# Patient Record
Sex: FEMALE | Race: WHITE | NOT HISPANIC OR LATINO
[De-identification: names, ages, dates, MRNs, and addresses within clinical notes are randomized per-mention and may not be internally consistent; named-entity substitution may affect disease eponyms.]

---

## 2022-09-08 ENCOUNTER — APPOINTMENT (OUTPATIENT)
Dept: PAIN MANAGEMENT | Facility: CLINIC | Age: 67
End: 2022-09-08

## 2022-09-08 VITALS
DIASTOLIC BLOOD PRESSURE: 79 MMHG | HEART RATE: 80 BPM | SYSTOLIC BLOOD PRESSURE: 119 MMHG | HEIGHT: 62.5 IN | WEIGHT: 174 LBS | BODY MASS INDEX: 31.22 KG/M2

## 2022-09-08 DIAGNOSIS — G44.81 HYPNIC HEADACHE: ICD-10-CM

## 2022-09-08 PROBLEM — Z00.00 ENCOUNTER FOR PREVENTIVE HEALTH EXAMINATION: Status: ACTIVE | Noted: 2022-09-08

## 2022-09-08 PROCEDURE — 99204 OFFICE O/P NEW MOD 45 MIN: CPT

## 2022-09-13 NOTE — ASSESSMENT
[FreeTextEntry1] : trial of nortriptyline\par referral to movement for blepharospasm vs tic disorder

## 2022-09-13 NOTE — REVIEW OF SYSTEMS
[Fever] : no fever [Chills] : no chills [Feeling Poorly] : feeling poorly [As Noted in HPI] : as noted in HPI [Eye Pain] : eye pain [Nasal Discharge] : no nasal discharge [Chest Pain] : no chest pain [Shortness Of Breath] : no shortness of breath [Cough] : no cough [Constipation] : no constipation [Arthralgias] : arthralgias [Neck Pain] : neck pain [Lower Back Pain] : no lower back pain [Itching] : no itching [Convulsions] : no convulsions [Fainting] : no fainting [Sleep Disturbances] : sleep disturbances [Muscle Weakness] : no muscle weakness [FreeTextEntry3] : bilateral blepharospasm

## 2022-09-13 NOTE — HISTORY OF PRESENT ILLNESS
[FreeTextEntry1] : Pt is 67 yo woman who notes 40+ years of headaches.  Feels that they "used to be more migraine like" but now wakes up with pressure.\par She does have noted bilateral blepharospasm.\deven Can wake with headache or with some feeling of "stress in her head."\deven Does note her sleep is poor and she uses melatonin or sleep eeze at night.  Some nighttime awakening for urination and does use CPAP for sleep apnea.\par Also diagnosed 2-3 years ago by rheumatology for fibromyalgia.\deven Has had several MRI's of her head and sinuses done over time.\deven C maxifacial sinus non contrast in 2019 showed some thickening but patent.\par Lumbar MRI 5/2018 with stable ddd at several levels.\par MRI brain pre and post 4/2017 wnl.\deven Drinks decaf coffee.\deven Has been to chiropractor and has had some benefit with neck and shoulder.\par Did see Dr. mathis who had done some injections into neck and shoulders which has given her moderate benefit.\deven Has been on Topamax in the past but not "in many years."  Was on prozac but now on lexapro and wellbutrin and has been on just earlier this year.  \deven Has been on ramipril.\par + bilateral blepharospasm. [Chronic Headache] : chronic headache [Dizziness] : no dizziness [Nausea] : nausea [Neck Pain] : neck pain [Weakness] : no weakness [Daily] : daily [< 4 hours] : < 4 hours [stayed the same] : stayed the same [Stable] : The patient reports ~his/her~ symptoms since the last visit are stable

## 2022-09-13 NOTE — PHYSICAL EXAM
[General Appearance - Alert] : alert [General Appearance - Well Nourished] : well nourished [General Appearance - Well Developed] : well developed [General Appearance - Well-Appearing] : healthy appearing [Oriented To Time, Place, And Person] : oriented to person, place, and time [Impaired Insight] : insight and judgment were intact [Affect] : the affect was normal [Memory Recent] : recent memory was not impaired [Memory Remote] : remote memory was not impaired [Person] : oriented to person [Place] : oriented to place [Time] : oriented to time [Short Term Intact] : short term memory intact [Remote Intact] : remote memory intact [Registration Intact] : recent registration memory intact [Concentration Intact] : normal concentrating ability [Visual Intact] : visual attention was ~T not ~L decreased [Naming Objects] : no difficulty naming common objects [Writing A Sentence] : no difficulty writing a sentence [Fluency] : fluency intact [Comprehension] : comprehension intact [Reading] : reading intact [Current Events] : adequate knowledge of current events [Past History] : adequate knowledge of personal past history [Cranial Nerves Oculomotor (III)] : extraocular motion intact [Cranial Nerves Vestibulocochlear (VIII)] : hearing was intact bilaterally [Cranial Nerves Hypoglossal (XII)] : there was no tongue deviation with protrusion [Motor Strength] : muscle strength was normal in all four extremities [No Muscle Atrophy] : normal bulk in all four extremities [Motor Handedness Right-Handed] : the patient is right hand dominant [Motor Strength Upper Extremities Bilaterally] : strength was normal in both upper extremities [Sensation Tactile Decrease] : light touch was intact [Allodynia] : no ~T allodynia present [Tremor] : no tremor present [Dysdiadochokinesia Bilaterally] : not present [FreeTextEntry5] : bilateral blepharospasm?  part of greater tic disorder possibly [Sclera] : the sclera and conjunctiva were normal [No APD] : no afferent pupillary defect [Strabismus] : no strabismus was seen [Outer Ear] : the ears and nose were normal in appearance [Neck Appearance] : the appearance of the neck was normal [Neck Cervical Mass (___cm)] : no neck mass was observed [Exaggerated Use Of Accessory Muscles For Inspiration] : no accessory muscle use [Abnormal Walk] : normal gait [Involuntary Movements] : no involuntary movements were seen [Skin Color & Pigmentation] : normal skin color and pigmentation [] : no rash

## 2022-09-22 ENCOUNTER — TRANSCRIPTION ENCOUNTER (OUTPATIENT)
Age: 67
End: 2022-09-22

## 2022-11-29 ENCOUNTER — RX RENEWAL (OUTPATIENT)
Age: 67
End: 2022-11-29

## 2022-12-08 ENCOUNTER — APPOINTMENT (OUTPATIENT)
Dept: PAIN MANAGEMENT | Facility: CLINIC | Age: 67
End: 2022-12-08

## 2022-12-08 VITALS
HEART RATE: 101 BPM | SYSTOLIC BLOOD PRESSURE: 116 MMHG | BODY MASS INDEX: 31.94 KG/M2 | HEIGHT: 62.5 IN | DIASTOLIC BLOOD PRESSURE: 82 MMHG | WEIGHT: 178 LBS

## 2022-12-08 PROCEDURE — 99213 OFFICE O/P EST LOW 20 MIN: CPT

## 2022-12-08 NOTE — ASSESSMENT
[FreeTextEntry1] : Decrease Nortriptyline to 20mg at night in order to decrease daytime sleepiness. \par Will consider adding Zonisamide 25mg in future . \par Pt to follow up 1 month- TEB is ok.

## 2022-12-08 NOTE — HISTORY OF PRESENT ILLNESS
[FreeTextEntry1] : Pt returns today for a followup appt . \par Started Nortriptyline 10mg and is taking 3 tablets at night . \par Pt does find if she is not active during the day she will fall asleep .\par Nortriptyline is helping for sleep at night. \par Has not had improvement yet in chronic head pressure.  \par \par Pt has an appointment scheduled to see Dr Jolley in January for possible tic disorder .  [Chronic Headache] : chronic headache [Dizziness] : no dizziness [Nausea] : nausea [Neck Pain] : neck pain [Weakness] : no weakness [Daily] : daily [< 4 hours] : < 4 hours [stayed the same] : stayed the same [Stable] : The patient reports ~his/her~ symptoms since the last visit are stable

## 2023-01-09 ENCOUNTER — APPOINTMENT (OUTPATIENT)
Dept: PAIN MANAGEMENT | Facility: CLINIC | Age: 68
End: 2023-01-09
Payer: MEDICARE

## 2023-01-09 PROCEDURE — 99213 OFFICE O/P EST LOW 20 MIN: CPT | Mod: 95

## 2023-01-09 NOTE — HISTORY OF PRESENT ILLNESS
[FreeTextEntry1] : Pt returns today via TEB . SHe has decreased Nortriptyline to 20mg , has had improvement in daytime sleepiness but daily headaches persists. \par Pt denies any new symptoms. \par Overall health has been good. \par  [Headache] : headache [Nausea] : nausea [Neck Pain] : neck pain [Daily] : daily

## 2023-01-09 NOTE — ASSESSMENT
[FreeTextEntry1] : Decrease Nortriptyline to 10mg for 7 days and then stop ,\par Start Zonisamide. \par RTO 8 weeks.

## 2023-01-17 ENCOUNTER — APPOINTMENT (OUTPATIENT)
Dept: NEUROLOGY | Facility: CLINIC | Age: 68
End: 2023-01-17
Payer: MEDICARE

## 2023-01-17 VITALS
HEIGHT: 62.5 IN | BODY MASS INDEX: 33.37 KG/M2 | DIASTOLIC BLOOD PRESSURE: 80 MMHG | SYSTOLIC BLOOD PRESSURE: 122 MMHG | HEART RATE: 96 BPM | WEIGHT: 186 LBS

## 2023-01-17 PROCEDURE — 99205 OFFICE O/P NEW HI 60 MIN: CPT

## 2023-01-18 NOTE — PHYSICAL EXAM
[FreeTextEntry1] : Patient is awake and alert.  She is pleasant cooperative with the exam.  Face is symmetric tongue and uvula midline and symmetric extraocular movements are intact.  Patient is noted to have frequent blinking.  There is no forceful spasming of the eyelids.  She has no difficulty opening her eyes.  There are no sensory tricks.  Infrequent shoulder twitching is also seen\par Strength is intact there is no tremor bradykinesia or rigidity.  No dystonic features are seen.  No difficulty getting up from the chair.  Gait is unremarkable.

## 2023-01-18 NOTE — DISCUSSION/SUMMARY
[FreeTextEntry1] : This is a 67-year-old female who presents with chief complaints of tics.  Most bothersome takes involve frequent eye blinking.  This is causing her discomfort and tiredness of the face.  She has had tics since childhood.\par She is currently on Wellbutrin and Lexapro.  In the past has tried Haldol which caused side effects.  She has tried Botox injections for headaches but not for increased blinking\par Various treatment options were discussed with her patient is interested in trying Botox injections for focal tics causing increased blinking\par Patient will present to the clinic for Botox injections all questions were addressed and answered

## 2023-01-18 NOTE — HISTORY OF PRESENT ILLNESS
[FreeTextEntry1] : This is a 67-year-old right-handed female who presents with chief complaints of frequent eye blinking.  Patient is kindly referred by Dr. Torres to the movement disorders clinic\par \par Patient states that she has had history of tics as a child.  She has had frequent eye blinking neck rolling and shoulder twitching since she was a child.  Did not have any vocal tics.  Some of these movements are preceded by a premonitory urge.  She has some voluntary control over it but then she feels uncomfortable if she holds it for too long.  Because of the frequent eye blinking she is experiencing some tiredness in her face.  These movements do not interfere with her ability to read or drive.  Movements get worse if she thinks about it she denies any other aggravating or relieving factors. \par She endorses some OCD-like features for example she needs to line up things in the refrigerator.  She prefers everything to be in an even number.  She denies any ADHD like symptoms.  There is no family history of tics Tourette's ADHD or OCD.\par Patient also has history of headaches.  She was recently tried on nortriptyline and now switched to Zonegran.  She states that she has tried Botox injection in the past for headaches but it did not help.  She did not have any side effects with Botox.\par Current medications include Lexapro Wellbutrin Zonegran\par In her 30s Haldol was tried to help with the tics.  Small dose did not help but she had side effects on higher doses.\par \par Review of systems a complete review of systems performed and is negative except as listed in HPI\par \par She reports having normal MRIs of the brain.  I do not have the films

## 2023-03-02 ENCOUNTER — APPOINTMENT (OUTPATIENT)
Dept: NEUROLOGY | Facility: CLINIC | Age: 68
End: 2023-03-02
Payer: MEDICARE

## 2023-03-02 VITALS
WEIGHT: 187 LBS | DIASTOLIC BLOOD PRESSURE: 85 MMHG | HEIGHT: 62.5 IN | HEART RATE: 80 BPM | BODY MASS INDEX: 33.55 KG/M2 | SYSTOLIC BLOOD PRESSURE: 134 MMHG

## 2023-03-02 PROCEDURE — 64612 DESTROY NERVE FACE MUSCLE: CPT

## 2023-03-02 PROCEDURE — 99213 OFFICE O/P EST LOW 20 MIN: CPT | Mod: 25

## 2023-03-02 NOTE — PHYSICAL EXAM
[FreeTextEntry1] : Patient is awake and alert.  She is pleasant cooperative with the exam.  Face is symmetric tongue and uvula midline and symmetric extraocular movements are intact.  Patient is noted to have frequent blinking.  There is no forceful spasming of the eyelids.  She has no difficulty opening her eyes.  There are no sensory tricks.  Infrequent shoulder twitching is also seen.  She is noted to have platysmal spasm left worse than right\par Strength is intact there is no tremor bradykinesia or rigidity.  No dystonic features are seen.  No difficulty getting up from the chair.  Gait is unremarkable.

## 2023-03-02 NOTE — PROCEDURE
[FreeTextEntry1] : Area to be injected was cleansed with alcohol wipes.  100 units of Botox were mixed with 2 cc of normal saline and a ratio of 5 units/0.1cc\par \par I injected as follows\par Right upper eyelid 5/2\par Right lower eyelid 5/2\par Left upper eyelid 5/2\par Left lower eyelid 5/2\par Right platysma 10/4\par Left platysma 10/4\par \par Total injected 40 units\par Wasted 60 units\par Tolerated the procedure well

## 2023-03-02 NOTE — DISCUSSION/SUMMARY
[FreeTextEntry1] : This is a 67-year-old female who presents with chief complaints of tics.  Most bothersome takes involve frequent eye blinking.  This is causing her discomfort and tiredness of the face.  She has had tics since childhood.\par Impression-tics\par She is currently on Wellbutrin and Lexapro.  In the past has tried Haldol which caused side effects.  She has tried Botox injections for headaches but not for increased blinking\par Patient was injected as above.  She tolerated the procedure well\par We will have follow-up visit in 1 month\par Repeat Botox injections in 3 months

## 2023-03-02 NOTE — HISTORY OF PRESENT ILLNESS
[FreeTextEntry1] : This is a 67-year-old right-handed female who presents with chief complaints of frequent eye blinking.  Patient is kindly referred by Dr. Torres to the movement disorders clinic\par \par Patient states that she has had history of tics as a child.  She has had frequent eye blinking neck rolling and shoulder twitching since she was a child.  Did not have any vocal tics.  Some of these movements are preceded by a premonitory urge.  She has some voluntary control over it but then she feels uncomfortable if she holds it for too long.  Because of the frequent eye blinking she is experiencing some tiredness in her face.  These movements do not interfere with her ability to read or drive.  Movements get worse if she thinks about it she denies any other aggravating or relieving factors. \par She endorses some OCD-like features for example she needs to line up things in the refrigerator.  She prefers everything to be in an even number.  She denies any ADHD like symptoms.  There is no family history of tics Tourette's ADHD or OCD.\par Patient also has history of headaches.  She was recently tried on nortriptyline and now switched to Zonegran.  She states that she has tried Botox injection in the past for headaches but it did not help.  She did not have any side effects with Botox.\par Current medications include Lexapro Wellbutrin Zonegran\par In her 30s Haldol was tried to help with the tics.  Small dose did not help but she had side effects on higher doses.\par \par Review of systems a complete review of systems performed and is negative except as listed in HPI\par \par She reports having normal MRIs of the brain.  I do not have the films\par \par Interval history March 2, 2023.  Patient presents to receive Botox injections today.  She reports twitching around the eyes and the neck..  All her questions were addressed and answered

## 2023-03-14 ENCOUNTER — APPOINTMENT (OUTPATIENT)
Dept: PAIN MANAGEMENT | Facility: CLINIC | Age: 68
End: 2023-03-14
Payer: MEDICARE

## 2023-03-14 VITALS
WEIGHT: 187 LBS | DIASTOLIC BLOOD PRESSURE: 86 MMHG | HEART RATE: 88 BPM | SYSTOLIC BLOOD PRESSURE: 137 MMHG | HEIGHT: 62.5 IN | BODY MASS INDEX: 33.55 KG/M2

## 2023-03-14 PROCEDURE — 99214 OFFICE O/P EST MOD 30 MIN: CPT

## 2023-03-14 RX ORDER — NORTRIPTYLINE HYDROCHLORIDE 10 MG/1
10 CAPSULE ORAL
Qty: 90 | Refills: 2 | Status: DISCONTINUED | COMMUNITY
Start: 2022-09-08 | End: 2023-03-14

## 2023-03-14 NOTE — HISTORY OF PRESENT ILLNESS
[FreeTextEntry1] : Pt returns today for a follow up appt .\par Taking Zonisamide 50mg BID- patient unsure but believes she is "queasy" from Zonisamide. \par Continues to have daily headaches .\par \par Received Botox injections for eye blinking. \par  [Headache] : headache [Nausea] : nausea [Vomiting] : no Vomiting [Photophobia] : no photophobia [Phonophobia] : no phonophobia [Neck Pain] : no neck pain [Daily] : daily

## 2023-03-14 NOTE — ASSESSMENT
[FreeTextEntry1] : Decrease Zonisamide to 75mg - take at night \par trial of Nabumetone bridge - take with food .\par TEB 4-6 weeks

## 2023-03-14 NOTE — PHYSICAL EXAM
[General Appearance - Alert] : alert [General Appearance - In No Acute Distress] : in no acute distress [General Appearance - Well-Appearing] : healthy appearing [Oriented To Time, Place, And Person] : oriented to person, place, and time [Affect] : the affect was normal [Mood] : the mood was normal [Memory Remote] : remote memory was not impaired [Memory Recent] : recent memory was not impaired [Sclera] : the sclera and conjunctiva were normal [PERRL With Normal Accommodation] : pupils were equal in size, round, reactive to light, with normal accommodation [Extraocular Movements] : extraocular movements were intact [] : no respiratory distress [Abnormal Walk] : normal gait

## 2023-03-27 ENCOUNTER — NON-APPOINTMENT (OUTPATIENT)
Age: 68
End: 2023-03-27

## 2023-03-29 ENCOUNTER — APPOINTMENT (OUTPATIENT)
Dept: NEUROLOGY | Facility: CLINIC | Age: 68
End: 2023-03-29
Payer: MEDICARE

## 2023-03-29 PROCEDURE — 99214 OFFICE O/P EST MOD 30 MIN: CPT | Mod: 95

## 2023-03-29 NOTE — HISTORY OF PRESENT ILLNESS
[Home] : at home, [unfilled] , at the time of the visit. [Medical Office: ([facility])___] : at the medical office located in  [Verbal consent obtained from patient] : the patient, [unfilled] [FreeTextEntry1] : This is a 67-year-old right-handed female who presents with chief complaints of frequent eye blinking.  Patient is kindly referred by Dr. Torres to the movement disorders clinic\par \par Patient states that she has had history of tics as a child.  She has had frequent eye blinking neck rolling and shoulder twitching since she was a child.  Did not have any vocal tics.  Some of these movements are preceded by a premonitory urge.  She has some voluntary control over it but then she feels uncomfortable if she holds it for too long.  Because of the frequent eye blinking she is experiencing some tiredness in her face.  These movements do not interfere with her ability to read or drive.  Movements get worse if she thinks about it she denies any other aggravating or relieving factors. \par She endorses some OCD-like features for example she needs to line up things in the refrigerator.  She prefers everything to be in an even number.  She denies any ADHD like symptoms.  There is no family history of tics Tourette's ADHD or OCD.\par Patient also has history of headaches.  She was recently tried on nortriptyline and now switched to Zonegran.  She states that she has tried Botox injection in the past for headaches but it did not help.  She did not have any side effects with Botox.\par Current medications include Lexapro Wellbutrin Zonegran\par In her 30s Haldol was tried to help with the tics.  Small dose did not help but she had side effects on higher doses.\par \par Review of systems a complete review of systems performed and is negative except as listed in HPI\par \par She reports having normal MRIs of the brain.  I do not have the films\par \par Interval history March 2, 2023.  Patient presents to receive Botox injections today.  She reports twitching around the eyes and the neck..  All her questions were addressed and answered\par \par Interval history March 29, 2023.  This is a telehealth visit patient states that she has noticed about 50 to 60% improvement in twitching around her eyes.  The twitching is still happening if she thinks about it but when she is distracted the twitching is less.  She is happy with that.  She has also noticed that there is some weakness and difficulty in fully closing the left eye.  Also she feels that the right lid is droopy.  She has been using artificial tears for a long time.  She has not noticed much neck twitching.

## 2023-03-29 NOTE — PROCEDURE
[FreeTextEntry1] : Patient was NOT injected today \par At the last visit \par area to be injected was cleansed with alcohol wipes.  100 units of Botox were mixed with 2 cc of normal saline and a ratio of 5 units/0.1cc\par \par I injected as follows\par Right upper eyelid 5/2\par Right lower eyelid 5/2\par Left upper eyelid 5/2\par Left lower eyelid 5/2\par Right platysma 10/4\par Left platysma 10/4\par \par Total injected 40 units\par Wasted 60 units\par Tolerated the procedure well

## 2023-03-29 NOTE — DISCUSSION/SUMMARY
[FreeTextEntry1] : This is a 67-year-old female who presents with chief complaints of tics.  Most bothersome takes involve frequent eye blinking.  This is causing her discomfort and tiredness of the face.  She has had tics since childhood.\par Impression-tics\par She is currently on Wellbutrin and Lexapro.  In the past has tried Haldol which caused side effects.  She has tried Botox injections for headaches but not for increased blinking\par Patient was injected as above a few weeks ago.  She notices that the blinking is 50 to 60% improved however there is some droopiness of the right lid and weakness of the left eye lid.  At the next visit we will plan on injecting slightly less around the eyes.  She will monitor for neck spasms and see if they were less or not.  Eye care to prevent corneal dryness\par Repeat Botox injections in 3 months

## 2023-03-29 NOTE — PHYSICAL EXAM
[FreeTextEntry1] : Patient is awake and alert.  She is pleasant cooperative with the exam.  Face is symmetric tongue and uvula midline and symmetric extraocular movements are intact.  Patient is noted to have some increased blinking  there is no forceful spasming of the eyelids.  She has no difficulty opening her eyes.  there are no sensory tricks.  Infrequent shoulder twitching is also seen.  She is noted to have platysmal spasm left worse than right\par Strength is intact there is no tremor bradykinesia or rigidity.  No dystonic features are seen.  No difficulty getting up from the chair.  Gait is unremarkable.\par \par Eye blinking is less than before.  When the patient forcefully tries to open her eyelids there is some weakness on the left more than the right.  Droopiness is not appreciated

## 2023-04-11 ENCOUNTER — APPOINTMENT (OUTPATIENT)
Dept: PAIN MANAGEMENT | Facility: CLINIC | Age: 68
End: 2023-04-11
Payer: MEDICARE

## 2023-04-11 PROCEDURE — 99212 OFFICE O/P EST SF 10 MIN: CPT | Mod: 95

## 2023-04-11 NOTE — REASON FOR VISIT
[Home] : at home, [unfilled] , at the time of the visit. [Medical Office: ([facility])___] : at the medical office located in  [Patient] : the patient [Follow-Up: _____] : a [unfilled] follow-up visit

## 2023-04-11 NOTE — HISTORY OF PRESENT ILLNESS
[FreeTextEntry1] : At our last appt we decreased Zonisamide from 4 tab / day to 3 tab per day due to nausea.This decrease has been helpful . Pt is dosing at night and tolerating the medication much better. \par Headache frequency has improved as well. \par Pt denies any new symptoms. \par

## 2023-05-29 ENCOUNTER — RX RENEWAL (OUTPATIENT)
Age: 68
End: 2023-05-29

## 2023-05-30 ENCOUNTER — APPOINTMENT (OUTPATIENT)
Dept: NEUROLOGY | Facility: CLINIC | Age: 68
End: 2023-05-30
Payer: MEDICARE

## 2023-05-30 VITALS
HEIGHT: 72 IN | BODY MASS INDEX: 25.33 KG/M2 | SYSTOLIC BLOOD PRESSURE: 118 MMHG | WEIGHT: 187 LBS | DIASTOLIC BLOOD PRESSURE: 78 MMHG | HEART RATE: 83 BPM

## 2023-05-30 PROCEDURE — 64612 DESTROY NERVE FACE MUSCLE: CPT | Mod: 50

## 2023-05-30 PROCEDURE — 99214 OFFICE O/P EST MOD 30 MIN: CPT | Mod: 25

## 2023-05-30 PROCEDURE — 64616 CHEMODENERV MUSC NECK DYSTON: CPT | Mod: 50

## 2023-05-30 NOTE — DISCUSSION/SUMMARY
[FreeTextEntry1] : This is a 67-year-old female who presents with chief complaints of tics.  Most bothersome takes involve frequent eye blinking.  This is causing her discomfort and tiredness of the face.  She has had tics since childhood.\par Impression- tics\par She is currently on Wellbutrin and Lexapro.  In the past has tried Haldol which caused side effects.  She has tried Botox injections for headaches but not for increased blinking\par \par Patient was injected as above  Eye care to prevent corneal dryness, follow-up in 1 to 2 months\par Repeat Botox injections in 3 months

## 2023-05-30 NOTE — HISTORY OF PRESENT ILLNESS
[FreeTextEntry1] : This is a 67-year-old right-handed female who presents with chief complaints of frequent eye blinking.  Patient is kindly referred by Dr. Torres to the movement disorders clinic\par \par Patient states that she has had history of tics as a child.  She has had frequent eye blinking neck rolling and shoulder twitching since she was a child.  Did not have any vocal tics.  Some of these movements are preceded by a premonitory urge.  She has some voluntary control over it but then she feels uncomfortable if she holds it for too long.  Because of the frequent eye blinking she is experiencing some tiredness in her face.  These movements do not interfere with her ability to read or drive.  Movements get worse if she thinks about it she denies any other aggravating or relieving factors. \par She endorses some OCD-like features for example she needs to line up things in the refrigerator.  She prefers everything to be in an even number.  She denies any ADHD like symptoms.  There is no family history of tics Tourette's ADHD or OCD.\par Patient also has history of headaches.  She was recently tried on nortriptyline and now switched to Zonegran.  She states that she has tried Botox injection in the past for headaches but it did not help.  She did not have any side effects with Botox.\par Current medications include Lexapro Wellbutrin Zonegran\par In her 30s Haldol was tried to help with the tics.  Small dose did not help but she had side effects on higher doses.\par \par Review of systems a complete review of systems performed and is negative except as listed in HPI\par \par She reports having normal MRIs of the brain.  I do not have the films\par \par Interval history March 2, 2023.  Patient presents to receive Botox injections today.  She reports twitching around the eyes and the neck..  All her questions were addressed and answered\par \par Interval history March 29, 2023.  This is a telehealth visit patient states that she has noticed about 50 to 60% improvement in twitching around her eyes.  The twitching is still happening if she thinks about it but when she is distracted the twitching is less.  She is happy with that.  She has also noticed that there is some weakness and difficulty in fully closing the left eye.  Also she feels that the right lid is droopy.  She has been using artificial tears for a long time.  She has not noticed much neck twitching.\par \par Interval history May 30, 2023.  Patient presented for repeat Botox injections.  She states that the side effects from last Botox injection lasted about 2 months where in her right eye would not close completely and she had droopiness of the left side.  She did not notice much twitching in the neck region.  States that it happens only when she is attention is drawn to it.  She does report having a lot of pain and spasm in her bilateral trapezius.  Requests Botox there.  States that headaches are much better with zonisamide

## 2023-05-30 NOTE — PHYSICAL EXAM
[FreeTextEntry1] : Patient is awake and alert.  She is pleasant cooperative with the exam.  Face is symmetric tongue and uvula midline and symmetric extraocular movements are intact.  Patient is noted to have some increased blinking  there is no forceful spasming of the eyelids.  She has no difficulty opening her eyes.  there are no sensory tricks.  Infrequent shoulder twitching is also seen.  She is noted to have platysmal spasm left worse than right\par Strength is intact there is no tremor bradykinesia or rigidity.  No dystonic features are seen.  No difficulty getting up from the chair.  Gait is unremarkable.\par \par Eye blinking and platysma twitching present.  Lateral trapezius muscles feel very tight left worse than right

## 2023-05-30 NOTE — PROCEDURE
[FreeTextEntry1] : \par area to be injected was cleansed with alcohol wipes.  100 units of Botox were mixed with 2 cc of normal saline and a ratio of 5 units/0.1cc\par \par I injected as follows\par Right upper eyelid 5/2\par Right lower eyelid 2.5 units in the lower lid lateral aspect\par Left upper eyelid 5/2\par Left lower eyelid  2.5 units in the lower lid lateral aspect\par Right platysma 10/4\par Left platysma 10/4\par Right trapezius 25 units\par Left trapezius 25 units\par \par Total injected 85 units\par Wasted 15 units\par Tolerated the procedure well

## 2023-07-11 ENCOUNTER — APPOINTMENT (OUTPATIENT)
Dept: NEUROLOGY | Facility: CLINIC | Age: 68
End: 2023-07-11

## 2023-08-08 ENCOUNTER — APPOINTMENT (OUTPATIENT)
Dept: PAIN MANAGEMENT | Facility: CLINIC | Age: 68
End: 2023-08-08
Payer: MEDICARE

## 2023-08-08 VITALS
DIASTOLIC BLOOD PRESSURE: 90 MMHG | SYSTOLIC BLOOD PRESSURE: 153 MMHG | HEART RATE: 84 BPM | BODY MASS INDEX: 33.55 KG/M2 | HEIGHT: 62.5 IN | WEIGHT: 187 LBS

## 2023-08-08 PROCEDURE — 99213 OFFICE O/P EST LOW 20 MIN: CPT

## 2023-08-08 RX ORDER — BUPROPION HYDROCHLORIDE 150 MG/1
150 TABLET, EXTENDED RELEASE ORAL
Refills: 0 | Status: ACTIVE | COMMUNITY
Start: 2023-08-08

## 2023-08-08 RX ORDER — RAMIPRIL 2.5 MG/1
2.5 CAPSULE ORAL
Refills: 0 | Status: ACTIVE | COMMUNITY
Start: 2023-08-08

## 2023-08-08 RX ORDER — ROSUVASTATIN CALCIUM 20 MG/1
20 TABLET, FILM COATED ORAL
Refills: 0 | Status: ACTIVE | COMMUNITY
Start: 2023-08-08

## 2023-08-08 RX ORDER — NABUMETONE 750 MG/1
750 TABLET, FILM COATED ORAL
Qty: 225 | Refills: 0 | Status: ACTIVE | COMMUNITY
Start: 2023-03-14 | End: 1900-01-01

## 2023-08-08 RX ORDER — ESCITALOPRAM OXALATE 20 MG/1
20 TABLET, FILM COATED ORAL
Refills: 0 | Status: ACTIVE | COMMUNITY
Start: 2023-08-08

## 2023-08-08 NOTE — HISTORY OF PRESENT ILLNESS
[FreeTextEntry1] : Pt returns today for a follow up appt.  Taking Zonisamide 75mg/ day . Pt received trapezius muscle Botox at last appt with Dr Jolley and believes it helped decrease h/a.

## 2023-08-29 ENCOUNTER — APPOINTMENT (OUTPATIENT)
Dept: NEUROLOGY | Facility: CLINIC | Age: 68
End: 2023-08-29
Payer: MEDICARE

## 2023-08-29 VITALS
SYSTOLIC BLOOD PRESSURE: 150 MMHG | DIASTOLIC BLOOD PRESSURE: 85 MMHG | WEIGHT: 190 LBS | BODY MASS INDEX: 34.09 KG/M2 | HEART RATE: 76 BPM | HEIGHT: 62.5 IN

## 2023-08-29 PROCEDURE — 99214 OFFICE O/P EST MOD 30 MIN: CPT | Mod: 25

## 2023-08-29 PROCEDURE — 64612 DESTROY NERVE FACE MUSCLE: CPT

## 2023-08-29 PROCEDURE — 64616 CHEMODENERV MUSC NECK DYSTON: CPT

## 2023-08-29 NOTE — HISTORY OF PRESENT ILLNESS
[FreeTextEntry1] : This is a 67-year-old right-handed female who presents with chief complaints of frequent eye blinking.  Patient is kindly referred by Dr. Torres to the movement disorders clinic  Patient states that she has had history of tics as a child.  She has had frequent eye blinking neck rolling and shoulder twitching since she was a child.  Did not have any vocal tics.  Some of these movements are preceded by a premonitory urge.  She has some voluntary control over it but then she feels uncomfortable if she holds it for too long.  Because of the frequent eye blinking she is experiencing some tiredness in her face.  These movements do not interfere with her ability to read or drive.  Movements get worse if she thinks about it she denies any other aggravating or relieving factors.  She endorses some OCD-like features for example she needs to line up things in the refrigerator.  She prefers everything to be in an even number.  She denies any ADHD like symptoms.  There is no family history of tics Tourette's ADHD or OCD. Patient also has history of headaches.  She was recently tried on nortriptyline and now switched to Zonegran.  She states that she has tried Botox injection in the past for headaches but it did not help.  She did not have any side effects with Botox. Current medications include Lexapro Wellbutrin Zonegran In her 30s Haldol was tried to help with the tics.  Small dose did not help but she had side effects on higher doses.  Review of systems a complete review of systems performed and is negative except as listed in HPI  She reports having normal MRIs of the brain.  I do not have the films  Interval history March 2, 2023.  Patient presents to receive Botox injections today.  She reports twitching around the eyes and the neck..  All her questions were addressed and answered  Interval history March 29, 2023.  This is a telehealth visit patient states that she has noticed about 50 to 60% improvement in twitching around her eyes.  The twitching is still happening if she thinks about it but when she is distracted the twitching is less.  She is happy with that.  She has also noticed that there is some weakness and difficulty in fully closing the left eye.  Also she feels that the right lid is droopy.  She has been using artificial tears for a long time.  She has not noticed much neck twitching.  Interval history May 30, 2023.  Patient presented for repeat Botox injections.  She states that the side effects from last Botox injection lasted about 2 months where in her right eye would not close completely and she had droopiness of the left side.  She did not notice much twitching in the neck region.  States that it happens only when she is attention is drawn to it.  She does report having a lot of pain and spasm in her bilateral trapezius.  Requests Botox there.  States that headaches are much better with zonisamide  Interval history August 29, 2023.  Patient states that the last set of Botox injections worked well for her.  The blinking was much less also with the trapezius injections she felt her headaches were better controlled.  She is still not sure if the injections for platysma not helping as the tics really do not bother her that much .

## 2023-08-29 NOTE — DISCUSSION/SUMMARY
[FreeTextEntry1] : This is a 67-year-old female who presents with chief complaints of tics.  Most bothersome takes involve frequent eye blinking.  This is causing her discomfort and tiredness of the face.  She has had tics since childhood. Impression- tics She is currently on Wellbutrin and Lexapro.  In the past has tried Haldol which caused side effects.  She has tried Botox injections for headaches but not for increased blinking.  Last set of injection worked well for her especially with the blinking and headaches Repeat Botox injections in 3 months

## 2023-08-29 NOTE — PROCEDURE
[FreeTextEntry1] : area to be injected was cleansed with alcohol wipes.  100 units of Botox were mixed with 2 cc of normal saline and a ratio of 5 units/0.1cc  I injected as follows Right upper eyelid 5/2 Right lower eyelid 2.5 units in the lower lid lateral aspect Left upper eyelid 5/2 Left lower eyelid  2.5 units in the lower lid lateral aspect Right platysma 10/4 Left platysma 10/4 Right trapezius 25 units Left trapezius 25 units  Total injected 85 units Wasted 15 units Tolerated the procedure well

## 2023-09-26 ENCOUNTER — APPOINTMENT (OUTPATIENT)
Dept: PAIN MANAGEMENT | Facility: CLINIC | Age: 68
End: 2023-09-26
Payer: MEDICARE

## 2023-09-26 PROCEDURE — 99213 OFFICE O/P EST LOW 20 MIN: CPT | Mod: 95

## 2023-09-26 RX ORDER — UBROGEPANT 100 MG/1
100 TABLET ORAL
Qty: 1 | Refills: 0 | Status: ACTIVE | COMMUNITY
Start: 2023-09-26 | End: 1900-01-01

## 2023-11-28 ENCOUNTER — APPOINTMENT (OUTPATIENT)
Dept: NEUROLOGY | Facility: CLINIC | Age: 68
End: 2023-11-28
Payer: MEDICARE

## 2023-11-28 VITALS
HEIGHT: 62.5 IN | SYSTOLIC BLOOD PRESSURE: 133 MMHG | BODY MASS INDEX: 34.81 KG/M2 | HEART RATE: 76 BPM | DIASTOLIC BLOOD PRESSURE: 80 MMHG | WEIGHT: 194 LBS

## 2023-11-28 DIAGNOSIS — G43.709 CHRONIC MIGRAINE W/OUT AURA, NOT INTRACTABLE, W/OUT STATUS MIGRAINOSUS: ICD-10-CM

## 2023-11-28 PROCEDURE — 99214 OFFICE O/P EST MOD 30 MIN: CPT | Mod: 25

## 2023-11-28 PROCEDURE — 64615 CHEMODENERV MUSC MIGRAINE: CPT

## 2023-12-19 ENCOUNTER — TRANSCRIPTION ENCOUNTER (OUTPATIENT)
Age: 68
End: 2023-12-19

## 2024-01-05 ENCOUNTER — TRANSCRIPTION ENCOUNTER (OUTPATIENT)
Age: 69
End: 2024-01-05

## 2024-01-08 ENCOUNTER — TRANSCRIPTION ENCOUNTER (OUTPATIENT)
Age: 69
End: 2024-01-08

## 2024-02-27 ENCOUNTER — APPOINTMENT (OUTPATIENT)
Dept: NEUROLOGY | Facility: CLINIC | Age: 69
End: 2024-02-27
Payer: MEDICARE

## 2024-02-27 VITALS
WEIGHT: 191 LBS | BODY MASS INDEX: 34.27 KG/M2 | HEIGHT: 62.5 IN | SYSTOLIC BLOOD PRESSURE: 123 MMHG | HEART RATE: 75 BPM | DIASTOLIC BLOOD PRESSURE: 75 MMHG

## 2024-02-27 DIAGNOSIS — H02.59 OTHER DISORDERS AFFECTING EYELID FUNCTION: ICD-10-CM

## 2024-02-27 PROCEDURE — 64612 DESTROY NERVE FACE MUSCLE: CPT | Mod: 50

## 2024-02-27 PROCEDURE — 64616 CHEMODENERV MUSC NECK DYSTON: CPT | Mod: 50

## 2024-02-28 PROBLEM — H02.59 EXCESSIVE INVOLUNTARY BLINKING: Status: ACTIVE | Noted: 2023-01-18

## 2024-02-28 NOTE — HISTORY OF PRESENT ILLNESS
[FreeTextEntry1] : This is a 67-year-old right-handed female who presents with chief complaints of frequent eye blinking.  Patient is kindly referred by Dr. Torres to the movement disorders clinic  Patient states that she has had history of tics as a child.  She has had frequent eye blinking neck rolling and shoulder twitching since she was a child.  Did not have any vocal tics.  Some of these movements are preceded by a premonitory urge.  She has some voluntary control over it but then she feels uncomfortable if she holds it for too long.  Because of the frequent eye blinking she is experiencing some tiredness in her face.  These movements do not interfere with her ability to read or drive.  Movements get worse if she thinks about it she denies any other aggravating or relieving factors.  She endorses some OCD-like features for example she needs to line up things in the refrigerator.  She prefers everything to be in an even number.  She denies any ADHD like symptoms.  There is no family history of tics Tourette's ADHD or OCD. Patient also has history of headaches.  She was recently tried on nortriptyline and now switched to Zonegran.  She states that she has tried Botox injection in the past for headaches but it did not help.  She did not have any side effects with Botox. Current medications include Lexapro Wellbutrin Zonegran In her 30s Haldol was tried to help with the tics.  Small dose did not help but she had side effects on higher doses.  Review of systems a complete review of systems performed and is negative except as listed in HPI  She reports having normal MRIs of the brain.  I do not have the films  Interval history March 2, 2023.  Patient presents to receive Botox injections today.  She reports twitching around the eyes and the neck..  All her questions were addressed and answered  Interval history March 29, 2023.  This is a telehealth visit patient states that she has noticed about 50 to 60% improvement in twitching around her eyes.  The twitching is still happening if she thinks about it but when she is distracted the twitching is less.  She is happy with that.  She has also noticed that there is some weakness and difficulty in fully closing the left eye.  Also she feels that the right lid is droopy.  She has been using artificial tears for a long time.  She has not noticed much neck twitching.  Interval history May 30, 2023.  Patient presented for repeat Botox injections.  She states that the side effects from last Botox injection lasted about 2 months where in her right eye would not close completely and she had droopiness of the left side.  She did not notice much twitching in the neck region.  States that it happens only when she is attention is drawn to it.  She does report having a lot of pain and spasm in her bilateral trapezius.  Requests Botox there.  States that headaches are much better with zonisamide  Interval history August 29, 2023.  Patient states that the last set of Botox injections worked well for her.  The blinking was much less also with the trapezius injections she felt her headaches were better controlled.  She is still not sure if the injections for platysma not helping as the tics really do not bother her that much .  Interval history November 28, 2023.  Patient states that the last set of Botox injection was not as effective.  She still has the blinking she is not really bothered by the spasms and the platysma she feels it is more when she is at the clinic but does not see it at home.  She finds that the tension in her neck and shoulders especially in the trapezius region has been more and is wondering if those could be injected with a higher dose.  She denies any side effects on this dosing but states that in the past she has had difficulty where soap would get in her eyes while washing her face  Interval history February 27, 2024.  Patient states that she has noticed less blinking.  Still feels tension in the trapezius region.  Requests repeat injections today.  Does not find platysma spasms bothersome and does not wish for them to be injected.  Denies any side effects with Botox injections

## 2024-02-28 NOTE — DISCUSSION/SUMMARY
[FreeTextEntry1] : This is a 68year-old female who presents with chief complaints of tics.  Most bothersome tics involve frequent eye blinking.  This is causing her discomfort and tiredness of the face.  She has had tics since childhood. She is currently on Wellbutrin and Lexapro.  In the past has tried Haldol which caused side effects.    Last set of injection worked well for her especially with the blinking and headaches  Impression- tics Patient was injected as above.  Did not inject bilateral platysma as per her request.   Repeat Botox injections in 3 months, will call sooner if she has any side effects

## 2024-02-28 NOTE — PROCEDURE
[FreeTextEntry1] : area to be injected was cleansed with alcohol wipes.  100 units of Botox were mixed with 2 cc of normal saline and a ratio of 5 units/0.1cc  I injected as follows Right upper eyelid 5/2 Right orbicularis oculi lateral aspect 2.5 units Right lower eyelid 2.5 units in the lower lid lateral aspect Left upper eyelid 5/2 Left orbicularis oculi lateral aspect 2.5 units Left lower eyelid  2.5 units in the lower lid lateral aspect Right trapezius  40 units Left trapezius 40 units   Not injected per patient request Right platysma 10/4 Left platysma 10/4  Total injected 100 units Wasted 0 units Tolerated the procedure well

## 2024-03-03 NOTE — ASSESSMENT
[FreeTextEntry1] : Continue currents meds \par RTO 4 months 
I have reviewed and confirmed nurses' notes...

## 2024-03-04 ENCOUNTER — APPOINTMENT (OUTPATIENT)
Dept: PAIN MANAGEMENT | Facility: CLINIC | Age: 69
End: 2024-03-04
Payer: MEDICARE

## 2024-03-04 VITALS
BODY MASS INDEX: 34.27 KG/M2 | WEIGHT: 191 LBS | HEIGHT: 62.5 IN | DIASTOLIC BLOOD PRESSURE: 72 MMHG | HEART RATE: 76 BPM | SYSTOLIC BLOOD PRESSURE: 109 MMHG

## 2024-03-04 PROCEDURE — 99213 OFFICE O/P EST LOW 20 MIN: CPT | Mod: 24

## 2024-03-04 RX ORDER — ZONISAMIDE 25 MG/1
25 CAPSULE ORAL
Qty: 120 | Refills: 3 | Status: ACTIVE | COMMUNITY
Start: 2023-01-09 | End: 1900-01-01

## 2024-03-04 NOTE — PHYSICAL EXAM
[General Appearance - Alert] : alert [General Appearance - In No Acute Distress] : in no acute distress [General Appearance - Well Nourished] : well nourished [General Appearance - Well Developed] : well developed [General Appearance - Well-Appearing] : healthy appearing [Oriented To Time, Place, And Person] : oriented to person, place, and time [Affect] : the affect was normal [Mood] : the mood was normal [Memory Recent] : recent memory was not impaired [Memory Remote] : remote memory was not impaired [Cranial Nerves Facial (VII)] : face symmetrical [Cranial Nerves Hypoglossal (XII)] : there was no tongue deviation with protrusion [Cranial Nerves Accessory (XI - Cranial And Spinal)] : head turning and shoulder shrug symmetric [Paresis Pronator Drift Right-Sided] : no pronator drift on the right [Motor Strength] : muscle strength was normal in all four extremities [Paresis Pronator Drift Left-Sided] : no pronator drift on the left [Motor Strength Lower Extremities Bilaterally] : strength was normal in both lower extremities [Motor Strength Upper Extremities Bilaterally] : strength was normal in both upper extremities [Abnormal Walk] : normal gait [Sclera] : the sclera and conjunctiva were normal [PERRL With Normal Accommodation] : pupils were equal in size, round, reactive to light, with normal accommodation [Extraocular Movements] : extraocular movements were intact [] : no respiratory distress

## 2024-03-04 NOTE — HISTORY OF PRESENT ILLNESS
[FreeTextEntry1] : Pt returns today for a follow up appt.  Recently had Botox injections done by Dr Jolley.  Pt continues to take Zonisamide , denies adverse effects.  Pt has not had a migraine in many weeks and reports headaches are less often as well.   Recently had cataract surgery.   [Headache] : headache

## 2024-05-28 ENCOUNTER — APPOINTMENT (OUTPATIENT)
Dept: NEUROLOGY | Facility: CLINIC | Age: 69
End: 2024-05-28
Payer: MEDICARE

## 2024-05-28 VITALS
WEIGHT: 190 LBS | HEIGHT: 62.5 IN | BODY MASS INDEX: 34.09 KG/M2 | HEART RATE: 72 BPM | DIASTOLIC BLOOD PRESSURE: 82 MMHG | SYSTOLIC BLOOD PRESSURE: 135 MMHG

## 2024-05-28 DIAGNOSIS — G24.5 BLEPHAROSPASM: ICD-10-CM

## 2024-05-28 PROCEDURE — 64612 DESTROY NERVE FACE MUSCLE: CPT | Mod: 50

## 2024-05-28 NOTE — HISTORY OF PRESENT ILLNESS
[FreeTextEntry1] : This is a 67-year-old right-handed female who presents with chief complaints of frequent eye blinking.  Patient is kindly referred by Dr. Torres to the movement disorders clinic  Patient states that she has had history of tics as a child.  She has had frequent eye blinking neck rolling and shoulder twitching since she was a child.  Did not have any vocal tics.  Some of these movements are preceded by a premonitory urge.  She has some voluntary control over it but then she feels uncomfortable if she holds it for too long.  Because of the frequent eye blinking she is experiencing some tiredness in her face.  These movements do not interfere with her ability to read or drive.  Movements get worse if she thinks about it she denies any other aggravating or relieving factors.  She endorses some OCD-like features for example she needs to line up things in the refrigerator.  She prefers everything to be in an even number.  She denies any ADHD like symptoms.  There is no family history of tics Tourette's ADHD or OCD. Patient also has history of headaches.  She was recently tried on nortriptyline and now switched to Zonegran.  She states that she has tried Botox injection in the past for headaches but it did not help.  She did not have any side effects with Botox. Current medications include Lexapro Wellbutrin Zonegran In her 30s Haldol was tried to help with the tics.  Small dose did not help but she had side effects on higher doses.  Review of systems a complete review of systems performed and is negative except as listed in HPI  She reports having normal MRIs of the brain.  I do not have the films  Interval history March 2, 2023.  Patient presents to receive Botox injections today.  She reports twitching around the eyes and the neck..  All her questions were addressed and answered  Interval history March 29, 2023.  This is a telehealth visit patient states that she has noticed about 50 to 60% improvement in twitching around her eyes.  The twitching is still happening if she thinks about it but when she is distracted the twitching is less.  She is happy with that.  She has also noticed that there is some weakness and difficulty in fully closing the left eye.  Also she feels that the right lid is droopy.  She has been using artificial tears for a long time.  She has not noticed much neck twitching.  Interval history May 30, 2023.  Patient presented for repeat Botox injections.  She states that the side effects from last Botox injection lasted about 2 months where in her right eye would not close completely and she had droopiness of the left side.  She did not notice much twitching in the neck region.  States that it happens only when she is attention is drawn to it.  She does report having a lot of pain and spasm in her bilateral trapezius.  Requests Botox there.  States that headaches are much better with zonisamide  Interval history August 29, 2023.  Patient states that the last set of Botox injections worked well for her.  The blinking was much less also with the trapezius injections she felt her headaches were better controlled.  She is still not sure if the injections for platysma not helping as the tics really do not bother her that much .  Interval history November 28, 2023.  Patient states that the last set of Botox injection was not as effective.  She still has the blinking she is not really bothered by the spasms and the platysma she feels it is more when she is at the clinic but does not see it at home.  She finds that the tension in her neck and shoulders especially in the trapezius region has been more and is wondering if those could be injected with a higher dose.  She denies any side effects on this dosing but states that in the past she has had difficulty where soap would get in her eyes while washing her face  Interval history February 27, 2024.  Patient states that she has noticed less blinking.  Still feels tension in the trapezius region.  Requests repeat injections today.  Does not find platysma spasms bothersome and does not wish for them to be injected.  Denies any side effects with Botox injections  Interval history May 28, 2024.  Patient presents for repeat Botox injections.  She notices that the blinking has been less also the tension in the trapezius region is better.  She had mild droopiness of the left eye for a few days but then it resolved.  Denies any other side effects.  Does not find that platysma spasms bother her and does not wish to be injected in the platysma.

## 2024-07-09 ENCOUNTER — RX RENEWAL (OUTPATIENT)
Age: 69
End: 2024-07-09

## 2024-08-09 ENCOUNTER — TRANSCRIPTION ENCOUNTER (OUTPATIENT)
Age: 69
End: 2024-08-09

## 2024-08-12 ENCOUNTER — TRANSCRIPTION ENCOUNTER (OUTPATIENT)
Age: 69
End: 2024-08-12

## 2024-08-26 ENCOUNTER — NON-APPOINTMENT (OUTPATIENT)
Age: 69
End: 2024-08-26

## 2024-08-27 ENCOUNTER — APPOINTMENT (OUTPATIENT)
Dept: NEUROLOGY | Facility: CLINIC | Age: 69
End: 2024-08-27
Payer: MEDICARE

## 2024-08-27 VITALS
SYSTOLIC BLOOD PRESSURE: 110 MMHG | HEART RATE: 80 BPM | DIASTOLIC BLOOD PRESSURE: 69 MMHG | WEIGHT: 179 LBS | HEIGHT: 62.5 IN | BODY MASS INDEX: 32.12 KG/M2

## 2024-08-27 DIAGNOSIS — G24.5 BLEPHAROSPASM: ICD-10-CM

## 2024-08-27 PROCEDURE — 64612 DESTROY NERVE FACE MUSCLE: CPT | Mod: 50

## 2024-08-27 PROCEDURE — 99213 OFFICE O/P EST LOW 20 MIN: CPT | Mod: 25

## 2024-08-28 NOTE — HISTORY OF PRESENT ILLNESS
[FreeTextEntry1] : This is a 67-year-old right-handed female who presents with chief complaints of frequent eye blinking.  Patient is kindly referred by Dr. Torres to the movement disorders clinic  Patient states that she has had history of tics as a child.  She has had frequent eye blinking neck rolling and shoulder twitching since she was a child.  Did not have any vocal tics.  Some of these movements are preceded by a premonitory urge.  She has some voluntary control over it but then she feels uncomfortable if she holds it for too long.  Because of the frequent eye blinking she is experiencing some tiredness in her face.  These movements do not interfere with her ability to read or drive.  Movements get worse if she thinks about it she denies any other aggravating or relieving factors.  She endorses some OCD-like features for example she needs to line up things in the refrigerator.  She prefers everything to be in an even number.  She denies any ADHD like symptoms.  There is no family history of tics Tourette's ADHD or OCD. Patient also has history of headaches.  She was recently tried on nortriptyline and now switched to Zonegran.  She states that she has tried Botox injection in the past for headaches but it did not help.  She did not have any side effects with Botox. Current medications include Lexapro Wellbutrin Zonegran In her 30s Haldol was tried to help with the tics.  Small dose did not help but she had side effects on higher doses.  Review of systems a complete review of systems performed and is negative except as listed in HPI  She reports having normal MRIs of the brain.  I do not have the films  Interval history March 2, 2023.  Patient presents to receive Botox injections today.  She reports twitching around the eyes and the neck..  All her questions were addressed and answered  Interval history March 29, 2023.  This is a telehealth visit patient states that she has noticed about 50 to 60% improvement in twitching around her eyes.  The twitching is still happening if she thinks about it but when she is distracted the twitching is less.  She is happy with that.  She has also noticed that there is some weakness and difficulty in fully closing the left eye.  Also she feels that the right lid is droopy.  She has been using artificial tears for a long time.  She has not noticed much neck twitching.  Interval history May 30, 2023.  Patient presented for repeat Botox injections.  She states that the side effects from last Botox injection lasted about 2 months where in her right eye would not close completely and she had droopiness of the left side.  She did not notice much twitching in the neck region.  States that it happens only when she is attention is drawn to it.  She does report having a lot of pain and spasm in her bilateral trapezius.  Requests Botox there.  States that headaches are much better with zonisamide  Interval history August 29, 2023.  Patient states that the last set of Botox injections worked well for her.  The blinking was much less also with the trapezius injections she felt her headaches were better controlled.  She is still not sure if the injections for platysma not helping as the tics really do not bother her that much .  Interval history November 28, 2023.  Patient states that the last set of Botox injection was not as effective.  She still has the blinking she is not really bothered by the spasms and the platysma she feels it is more when she is at the clinic but does not see it at home.  She finds that the tension in her neck and shoulders especially in the trapezius region has been more and is wondering if those could be injected with a higher dose.  She denies any side effects on this dosing but states that in the past she has had difficulty where soap would get in her eyes while washing her face  Interval history February 27, 2024.  Patient states that she has noticed less blinking.  Still feels tension in the trapezius region.  Requests repeat injections today.  Does not find platysma spasms bothersome and does not wish for them to be injected.  Denies any side effects with Botox injections  Interval history May 28, 2024.  Patient presents for repeat Botox injections.  She notices that the blinking has been less also the tension in the trapezius region is better.  She had mild droopiness of the left eye for a few days but then it resolved.  Denies any other side effects.  Does not find that platysma spasms bother her and does not wish to be injected in the platysma.  Interval Hx  August 27, 2024: patient is here for repeat botox injection. drooping of right eye following last injection. Otherwise no new complaint

## 2024-08-28 NOTE — DISCUSSION/SUMMARY
[FreeTextEntry1] : This is a 68year-old female who presents with chief complaints of tics.  Most bothersome tics involve frequent eye blinking.  This is causing her discomfort and tiredness of the face.  She has had tics since childhood. She is currently on Wellbutrin and Lexapro.  In the past has tried Haldol which caused side effects.    Last set of injection worked well for her especially with the blinking and headaches  Impression- tics Patient was injected as above.  reduced the dose for right eye, to avoid drooping. Did not inject bilateral platysma as per her request.   Repeat Botox injections in 3 months, will call sooner if she has any side effects We discussed the above impression, plan and recommendation during the visit. Counseling represented more than 50% of the 20-minute visit time

## 2024-08-28 NOTE — PROCEDURE
[FreeTextEntry1] : area to be injected was cleansed with alcohol wipes.  100 units of Botox were mixed with 2 cc of normal saline and a ratio of 5 units/0.1cc  I injected as follows Right upper eyelid 2.5/2 (less 2.5 c/w last visit) Right orbicularis oculi lateral aspect 2.5 units Right lower eyelid 1.25 units in the lower lid lateral aspect (less 1.25 c/w last visit) Left upper eyelid 5/2 Left orbicularis oculi lateral aspect 2.5 units Left lower eyelid  2.5 units in the lower lid lateral aspect Right trapezius  40 units Left trapezius 40 units   Not injected per patient request Right platysma 10/4 Left platysma 10/4  Total injected 96.25 units Wasted 3.75 units Tolerated the procedure well

## 2024-08-30 NOTE — ASSESSMENT
[FreeTextEntry1] : Chronic migraine , responding well to Zonisamide  RTO 6 months, call prn   maximum assist (25% patients effort)

## 2024-09-04 ENCOUNTER — NON-APPOINTMENT (OUTPATIENT)
Age: 69
End: 2024-09-04

## 2024-09-05 ENCOUNTER — APPOINTMENT (OUTPATIENT)
Dept: PAIN MANAGEMENT | Facility: CLINIC | Age: 69
End: 2024-09-05
Payer: MEDICARE

## 2024-09-05 VITALS
WEIGHT: 177 LBS | DIASTOLIC BLOOD PRESSURE: 78 MMHG | HEIGHT: 62 IN | HEART RATE: 82 BPM | SYSTOLIC BLOOD PRESSURE: 119 MMHG | BODY MASS INDEX: 32.57 KG/M2

## 2024-09-05 PROCEDURE — 99213 OFFICE O/P EST LOW 20 MIN: CPT

## 2024-09-05 RX ORDER — TIRZEPATIDE 5 MG/.5ML
5 INJECTION, SOLUTION SUBCUTANEOUS
Refills: 0 | Status: ACTIVE | COMMUNITY

## 2024-09-05 NOTE — HISTORY OF PRESENT ILLNESS
[Headache] : headache [FreeTextEntry1] : Pt returns today for a follow up appt. Taking Zonisamide 100mg at night. Tolerates it well.  Reports headaches are in better control and is happy with improvement.  Denies any new headache symptoms.  Continues to receive Botox injections with Dr Jolley.

## 2024-09-05 NOTE — PHYSICAL EXAM
[General Appearance - Alert] : alert [General Appearance - In No Acute Distress] : in no acute distress [General Appearance - Well Nourished] : well nourished [General Appearance - Well Developed] : well developed [General Appearance - Well-Appearing] : healthy appearing [Oriented To Time, Place, And Person] : oriented to person, place, and time [Affect] : the affect was normal [Mood] : the mood was normal [Memory Recent] : recent memory was not impaired [Memory Remote] : remote memory was not impaired [Cranial Nerves Facial (VII)] : face symmetrical [Cranial Nerves Hypoglossal (XII)] : there was no tongue deviation with protrusion [Cranial Nerves Accessory (XI - Cranial And Spinal)] : head turning and shoulder shrug symmetric [Motor Strength] : muscle strength was normal in all four extremities [Paresis Pronator Drift Right-Sided] : no pronator drift on the right [Paresis Pronator Drift Left-Sided] : no pronator drift on the left [Motor Strength Upper Extremities Bilaterally] : strength was normal in both upper extremities [Motor Strength Lower Extremities Bilaterally] : strength was normal in both lower extremities [Abnormal Walk] : normal gait [Sclera] : the sclera and conjunctiva were normal [PERRL With Normal Accommodation] : pupils were equal in size, round, reactive to light, with normal accommodation [Extraocular Movements] : extraocular movements were intact [] : no respiratory distress

## 2024-11-26 ENCOUNTER — APPOINTMENT (OUTPATIENT)
Dept: NEUROLOGY | Facility: CLINIC | Age: 69
End: 2024-11-26
Payer: MEDICARE

## 2024-11-26 VITALS
WEIGHT: 160 LBS | SYSTOLIC BLOOD PRESSURE: 120 MMHG | HEART RATE: 85 BPM | HEIGHT: 62 IN | DIASTOLIC BLOOD PRESSURE: 79 MMHG | BODY MASS INDEX: 29.44 KG/M2

## 2024-11-26 DIAGNOSIS — H02.59 OTHER DISORDERS AFFECTING EYELID FUNCTION: ICD-10-CM

## 2024-11-26 DIAGNOSIS — G24.5 BLEPHAROSPASM: ICD-10-CM

## 2024-11-26 PROCEDURE — 64612 DESTROY NERVE FACE MUSCLE: CPT | Mod: 50

## 2024-11-26 PROCEDURE — 99213 OFFICE O/P EST LOW 20 MIN: CPT | Mod: 25

## 2024-12-12 ENCOUNTER — RX RENEWAL (OUTPATIENT)
Age: 69
End: 2024-12-12

## 2025-02-27 ENCOUNTER — APPOINTMENT (OUTPATIENT)
Dept: NEUROLOGY | Facility: CLINIC | Age: 70
End: 2025-02-27

## 2025-03-20 ENCOUNTER — RX RENEWAL (OUTPATIENT)
Age: 70
End: 2025-03-20

## 2025-05-26 ENCOUNTER — NON-APPOINTMENT (OUTPATIENT)
Age: 70
End: 2025-05-26

## 2025-05-27 ENCOUNTER — APPOINTMENT (OUTPATIENT)
Dept: NEUROLOGY | Facility: CLINIC | Age: 70
End: 2025-05-27
Payer: MEDICARE

## 2025-05-27 VITALS
BODY MASS INDEX: 29.44 KG/M2 | HEIGHT: 62 IN | SYSTOLIC BLOOD PRESSURE: 117 MMHG | HEART RATE: 80 BPM | WEIGHT: 160 LBS | DIASTOLIC BLOOD PRESSURE: 75 MMHG

## 2025-05-27 DIAGNOSIS — H02.59 OTHER DISORDERS AFFECTING EYELID FUNCTION: ICD-10-CM

## 2025-05-27 DIAGNOSIS — G24.5 BLEPHAROSPASM: ICD-10-CM

## 2025-05-27 PROCEDURE — 64612 DESTROY NERVE FACE MUSCLE: CPT | Mod: 50

## 2025-05-27 PROCEDURE — 99214 OFFICE O/P EST MOD 30 MIN: CPT | Mod: 25

## 2025-06-14 ENCOUNTER — RX RENEWAL (OUTPATIENT)
Age: 70
End: 2025-06-14

## 2025-09-02 ENCOUNTER — APPOINTMENT (OUTPATIENT)
Dept: NEUROLOGY | Facility: CLINIC | Age: 70
End: 2025-09-02
Payer: MEDICARE

## 2025-09-02 VITALS
SYSTOLIC BLOOD PRESSURE: 129 MMHG | HEIGHT: 62 IN | HEART RATE: 77 BPM | DIASTOLIC BLOOD PRESSURE: 78 MMHG | WEIGHT: 139 LBS | BODY MASS INDEX: 25.58 KG/M2

## 2025-09-02 DIAGNOSIS — G24.5 BLEPHAROSPASM: ICD-10-CM

## 2025-09-02 PROCEDURE — 64616 CHEMODENERV MUSC NECK DYSTON: CPT | Mod: 50

## 2025-09-02 PROCEDURE — 64612 DESTROY NERVE FACE MUSCLE: CPT | Mod: 50

## 2025-09-02 PROCEDURE — 99214 OFFICE O/P EST MOD 30 MIN: CPT | Mod: 25

## 2025-09-05 ENCOUNTER — RX RENEWAL (OUTPATIENT)
Age: 70
End: 2025-09-05

## 2025-09-08 ENCOUNTER — APPOINTMENT (OUTPATIENT)
Dept: PAIN MANAGEMENT | Facility: CLINIC | Age: 70
End: 2025-09-08
Payer: MEDICARE

## 2025-09-08 VITALS
SYSTOLIC BLOOD PRESSURE: 115 MMHG | DIASTOLIC BLOOD PRESSURE: 74 MMHG | BODY MASS INDEX: 25.4 KG/M2 | HEIGHT: 62 IN | WEIGHT: 138 LBS | HEART RATE: 67 BPM

## 2025-09-08 PROCEDURE — 99214 OFFICE O/P EST MOD 30 MIN: CPT
